# Patient Record
Sex: MALE | Race: BLACK OR AFRICAN AMERICAN | NOT HISPANIC OR LATINO | Employment: FULL TIME | URBAN - METROPOLITAN AREA
[De-identification: names, ages, dates, MRNs, and addresses within clinical notes are randomized per-mention and may not be internally consistent; named-entity substitution may affect disease eponyms.]

---

## 2023-06-30 LAB
ALANINE AMINOTRANSFERASE (SGPT) (U/L) IN SER/PLAS: 13 U/L (ref 10–52)
ALBUMIN (G/DL) IN SER/PLAS: 4.5 G/DL (ref 3.4–5)
ALKALINE PHOSPHATASE (U/L) IN SER/PLAS: 61 U/L (ref 33–120)
ANION GAP IN SER/PLAS: 16 MMOL/L (ref 10–20)
ASPARTATE AMINOTRANSFERASE (SGOT) (U/L) IN SER/PLAS: 13 U/L (ref 9–39)
BILIRUBIN TOTAL (MG/DL) IN SER/PLAS: 0.4 MG/DL (ref 0–1.2)
CALCIUM (MG/DL) IN SER/PLAS: 9.9 MG/DL (ref 8.6–10.6)
CARBON DIOXIDE, TOTAL (MMOL/L) IN SER/PLAS: 29 MMOL/L (ref 21–32)
CHLORIDE (MMOL/L) IN SER/PLAS: 103 MMOL/L (ref 98–107)
CHOLESTEROL (MG/DL) IN SER/PLAS: 229 MG/DL (ref 0–199)
CHOLESTEROL IN HDL (MG/DL) IN SER/PLAS: 41 MG/DL
CHOLESTEROL/HDL RATIO: 5.6
CREATININE (MG/DL) IN SER/PLAS: 0.93 MG/DL (ref 0.5–1.3)
ERYTHROCYTE DISTRIBUTION WIDTH (RATIO) BY AUTOMATED COUNT: 13.2 % (ref 11.5–14.5)
ERYTHROCYTE MEAN CORPUSCULAR HEMOGLOBIN CONCENTRATION (G/DL) BY AUTOMATED: 32.2 G/DL (ref 32–36)
ERYTHROCYTE MEAN CORPUSCULAR VOLUME (FL) BY AUTOMATED COUNT: 92 FL (ref 80–100)
ERYTHROCYTES (10*6/UL) IN BLOOD BY AUTOMATED COUNT: 5.28 X10E12/L (ref 4.5–5.9)
ESTIMATED AVERAGE GLUCOSE FOR HBA1C: 180 MG/DL
GFR MALE: >90 ML/MIN/1.73M2
GLUCOSE (MG/DL) IN SER/PLAS: 144 MG/DL (ref 74–99)
HEMATOCRIT (%) IN BLOOD BY AUTOMATED COUNT: 48.8 % (ref 41–52)
HEMOGLOBIN (G/DL) IN BLOOD: 15.7 G/DL (ref 13.5–17.5)
HEMOGLOBIN A1C/HEMOGLOBIN TOTAL IN BLOOD: 7.9 %
LDL: 154 MG/DL (ref 0–99)
LEUKOCYTES (10*3/UL) IN BLOOD BY AUTOMATED COUNT: 5.9 X10E9/L (ref 4.4–11.3)
NRBC (PER 100 WBCS) BY AUTOMATED COUNT: 0 /100 WBC (ref 0–0)
PLATELETS (10*3/UL) IN BLOOD AUTOMATED COUNT: 248 X10E9/L (ref 150–450)
POTASSIUM (MMOL/L) IN SER/PLAS: 4.5 MMOL/L (ref 3.5–5.3)
PROTEIN TOTAL: 7.4 G/DL (ref 6.4–8.2)
SODIUM (MMOL/L) IN SER/PLAS: 143 MMOL/L (ref 136–145)
TRIGLYCERIDE (MG/DL) IN SER/PLAS: 172 MG/DL (ref 0–149)
UREA NITROGEN (MG/DL) IN SER/PLAS: 15 MG/DL (ref 6–23)
VLDL: 34 MG/DL (ref 0–40)

## 2023-07-03 LAB
PROSTATE SPECIFIC AG (NG/ML) IN SER/PLAS: 1 NG/ML (ref 0–4)
PROSTATE SPECIFIC AG FREE (NG/ML) IN SER/PLAS: 0.3 NG/ML
PROSTATE SPECIFIC AG FREE/PROSTATE SPECIFIC AG TOTAL IN SER/PLAS: 30 %

## 2023-08-28 ENCOUNTER — HOSPITAL ENCOUNTER (OUTPATIENT)
Dept: DATA CONVERSION | Facility: HOSPITAL | Age: 58
Discharge: HOME | End: 2023-08-28

## 2023-08-28 DIAGNOSIS — Z51.89 ENCOUNTER FOR OTHER SPECIFIED AFTERCARE: ICD-10-CM

## 2023-11-20 ENCOUNTER — OFFICE VISIT (OUTPATIENT)
Dept: PAIN MEDICINE | Facility: HOSPITAL | Age: 58
End: 2023-11-20

## 2023-11-20 DIAGNOSIS — M51.37 DEGENERATION OF LUMBAR OR LUMBOSACRAL INTERVERTEBRAL DISC: Primary | ICD-10-CM

## 2023-11-20 PROCEDURE — 99214 OFFICE O/P EST MOD 30 MIN: CPT | Performed by: ANESTHESIOLOGY

## 2023-11-20 RX ORDER — METFORMIN HYDROCHLORIDE 1000 MG/1
TABLET ORAL
COMMUNITY
Start: 2013-11-13

## 2023-11-20 RX ORDER — ATORVASTATIN CALCIUM 20 MG/1
1 TABLET, FILM COATED ORAL DAILY
COMMUNITY
Start: 2019-10-09

## 2023-11-20 RX ORDER — EMPAGLIFLOZIN 10 MG/1
1 TABLET, FILM COATED ORAL DAILY
COMMUNITY
Start: 2019-06-10

## 2023-11-20 RX ORDER — AMITRIPTYLINE HYDROCHLORIDE 10 MG/1
10 TABLET, FILM COATED ORAL NIGHTLY
Qty: 30 TABLET | Refills: 11 | Status: SHIPPED | OUTPATIENT
Start: 2023-11-20 | End: 2024-11-19

## 2023-11-20 RX ORDER — TOPIRAMATE 100 MG/1
1 TABLET, FILM COATED ORAL 2 TIMES DAILY
COMMUNITY

## 2023-11-20 ASSESSMENT — ENCOUNTER SYMPTOMS
RESPIRATORY NEGATIVE: 1
WEAKNESS: 1
NECK PAIN: 1
EYES NEGATIVE: 1
CARDIOVASCULAR NEGATIVE: 1
DIZZINESS: 1
CONSTITUTIONAL NEGATIVE: 1
GASTROINTESTINAL NEGATIVE: 1
ARTHRALGIAS: 1
LIGHT-HEADEDNESS: 1

## 2023-11-20 NOTE — PROGRESS NOTES
"Subjective   Patient ID: Estephania Walton is a 58 y.o. male who presents for follow up regarding his CRPS in left arm/hand.    HPI  Patient has history of complex regional pain syndrome in his left arm/hand/wrist.   This pain and weakness started after a fall in August 2022.  He reports he occasionally has episodes where he feels dizzy and almost \"blacks out\" which is what caused his fall back in August.  He reports his last dizzy episode was 3 weeks ago.  Denies recent falls.    He reports overall improvement of his hand  strength and pain since his last visit with us in February 2023.  His pain is mostly in his pisiform and left wrist.  Also has pain in his hand and posterior left neck.  He feels acupuncture and physical therapy have significantly helped him.  He reports he had less than 1 day of relief from C6/7 epidural injection we did on 1/10/23.  He reports no relief from Topiramate and his only medication he is taking now for pain is Motrin.       In February 2023 we referred him to orthopedics and neurosurgery to address his left wrist and his cervical stenosis worst at C5/6.  Orthopedics found no structural issues with his wrist and gave him a wrist brace to help with CRPS.  Neurosurgery note reports his symptoms are more consistent with CRPS and not from his stenosis.      We referred him to neurology back in December 2022 to address his dizzy episodes but he is unsure if he has seen a neurologist yet- no neurology notes seen in EMR however he did have an EMG performed 12/14/22 which showed normal results.     Review of Systems   Constitutional: Negative.    HENT: Negative.     Eyes: Negative.    Respiratory: Negative.     Cardiovascular: Negative.    Gastrointestinal: Negative.    Musculoskeletal:  Positive for arthralgias and neck pain.   Neurological:  Positive for dizziness, weakness and light-headedness. Negative for syncope.       Objective   Physical Exam  Vitals reviewed.   Constitutional:       " Appearance: Normal appearance.   HENT:      Head: Normocephalic and atraumatic.      Mouth/Throat:      Mouth: Mucous membranes are moist.   Eyes:      Extraocular Movements: Extraocular movements intact.      Conjunctiva/sclera: Conjunctivae normal.   Cardiovascular:      Rate and Rhythm: Normal rate.   Pulmonary:      Effort: Pulmonary effort is normal.   Musculoskeletal:         General: Tenderness present.      Right wrist: Normal.      Left wrist: Tenderness and bony tenderness present. Decreased range of motion.      Right hand: Normal.      Left hand: Tenderness present. Decreased range of motion. Decreased strength of finger abduction, thumb/finger opposition and wrist extension.      Cervical back: Normal range of motion.   Skin:     General: Skin is warm and dry.   Neurological:      Mental Status: He is alert and oriented to person, place, and time.      Sensory: Sensation is intact.      Motor: Weakness present.      Comments: Avendano's negative   Psychiatric:         Mood and Affect: Mood normal.         Behavior: Behavior normal.         Assessment/Plan     CRPS Left upper extremity    - continue acupuncture and physical therapy  1x/week.  Continue home PT exercises regularly.  We discussed continuing physical therapy to help manage the patient's painful condition as the mainstay of treatment for CRPS. The patient was counseled that muscle strengthening will improve the long term prognosis in regards to pain and may also help increase range of motion and mobility. The patient's questions were answered and he was agreeable to this course.  - we will again refer patient to see neurology to address his dizzy/pre-syncopal episodes   - start amitriptyline 25 mg at bedtime.  Discussed with patient side effects of this medication and discussed he may break pill in half and take only 1/2 of pill if he feels this is more tolerable   - dicussed with patient option for stellate ganglion nerve block that could  potentially help his symptoms.  Patient stated he will defer injections for now and would rather continue PT and acupuncture for the time being.         Plan discussed with patient and attending Dr. Marianne Simpson, DO - PGY2

## 2023-12-21 ENCOUNTER — OFFICE VISIT (OUTPATIENT)
Dept: PRIMARY CARE | Facility: CLINIC | Age: 58
End: 2023-12-21
Payer: COMMERCIAL

## 2023-12-21 VITALS
OXYGEN SATURATION: 97 % | HEART RATE: 95 BPM | HEIGHT: 70 IN | TEMPERATURE: 97.4 F | DIASTOLIC BLOOD PRESSURE: 80 MMHG | WEIGHT: 236.11 LBS | BODY MASS INDEX: 33.8 KG/M2 | RESPIRATION RATE: 16 BRPM | SYSTOLIC BLOOD PRESSURE: 140 MMHG

## 2023-12-21 DIAGNOSIS — E78.01 FAMILIAL HYPERCHOLESTEREMIA: ICD-10-CM

## 2023-12-21 DIAGNOSIS — E11.9 TYPE 2 DIABETES MELLITUS WITHOUT COMPLICATION, WITHOUT LONG-TERM CURRENT USE OF INSULIN (MULTI): ICD-10-CM

## 2023-12-21 DIAGNOSIS — I10 PRIMARY HYPERTENSION: ICD-10-CM

## 2023-12-21 DIAGNOSIS — Z00.00 ENCOUNTER FOR ANNUAL PHYSICAL EXAM: Primary | ICD-10-CM

## 2023-12-21 DIAGNOSIS — Z12.11 SCREENING FOR COLON CANCER: ICD-10-CM

## 2023-12-21 PROBLEM — M25.562 ACUTE PAIN OF LEFT KNEE: Status: RESOLVED | Noted: 2023-12-21 | Resolved: 2023-12-21

## 2023-12-21 PROBLEM — M25.532 LEFT WRIST PAIN: Status: RESOLVED | Noted: 2023-12-21 | Resolved: 2023-12-21

## 2023-12-21 PROBLEM — H90.71 MIXED CONDUCTIVE AND SENSORINEURAL HEARING LOSS OF RIGHT EAR WITH UNRESTRICTED HEARING OF LEFT EAR: Status: ACTIVE | Noted: 2023-12-21

## 2023-12-21 PROBLEM — H69.90 EUSTACHIAN TUBE DISORDER: Status: RESOLVED | Noted: 2023-12-21 | Resolved: 2023-12-21

## 2023-12-21 PROBLEM — R42 DIZZINESS: Status: RESOLVED | Noted: 2023-12-21 | Resolved: 2023-12-21

## 2023-12-21 PROBLEM — M54.50 LOW BACK PAIN: Status: RESOLVED | Noted: 2023-12-21 | Resolved: 2023-12-21

## 2023-12-21 PROBLEM — S62.629A AVULSION FRACTURE OF MIDDLE PHALANX OF FINGER: Status: RESOLVED | Noted: 2023-12-21 | Resolved: 2023-12-21

## 2023-12-21 PROBLEM — R52 PAIN: Status: RESOLVED | Noted: 2023-12-21 | Resolved: 2023-12-21

## 2023-12-21 PROBLEM — M54.40 LOW BACK PAIN WITH SCIATICA: Status: ACTIVE | Noted: 2023-12-21

## 2023-12-21 PROBLEM — N40.0 BPH (BENIGN PROSTATIC HYPERPLASIA): Status: ACTIVE | Noted: 2023-12-21

## 2023-12-21 PROBLEM — R19.7 DIARRHEAL STOOLS: Status: RESOLVED | Noted: 2023-12-21 | Resolved: 2023-12-21

## 2023-12-21 PROBLEM — U07.1 COVID-19: Status: RESOLVED | Noted: 2023-12-21 | Resolved: 2023-12-21

## 2023-12-21 PROBLEM — R42 ACUTE SEVERE VERTIGO: Status: ACTIVE | Noted: 2023-12-21

## 2023-12-21 PROBLEM — R20.0 NUMBNESS IN LEFT LEG: Status: RESOLVED | Noted: 2023-12-21 | Resolved: 2023-12-21

## 2023-12-21 PROBLEM — M54.9 MID BACK PAIN: Status: RESOLVED | Noted: 2023-12-21 | Resolved: 2023-12-21

## 2023-12-21 PROBLEM — H91.92 HEARING LOSS IN LEFT EAR: Status: ACTIVE | Noted: 2023-12-21

## 2023-12-21 PROBLEM — M54.42 ACUTE BACK PAIN WITH SCIATICA, LEFT: Status: RESOLVED | Noted: 2023-12-21 | Resolved: 2023-12-21
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
## 2023-12-21 PROBLEM — M79.674 PAIN OF TOE OF RIGHT FOOT: Status: RESOLVED | Noted: 2023-12-21 | Resolved: 2023-12-21

## 2023-12-21 PROBLEM — M79.645 PAIN OF FINGER OF LEFT HAND: Status: ACTIVE | Noted: 2023-12-21

## 2023-12-21 PROBLEM — H90.3 SENSORINEURAL HEARING LOSS, BILATERAL: Status: ACTIVE | Noted: 2023-07-06

## 2023-12-21 PROCEDURE — 1036F TOBACCO NON-USER: CPT | Performed by: INTERNAL MEDICINE

## 2023-12-21 PROCEDURE — 4010F ACE/ARB THERAPY RXD/TAKEN: CPT | Performed by: INTERNAL MEDICINE

## 2023-12-21 PROCEDURE — 3079F DIAST BP 80-89 MM HG: CPT | Performed by: INTERNAL MEDICINE

## 2023-12-21 PROCEDURE — 3077F SYST BP >= 140 MM HG: CPT | Performed by: INTERNAL MEDICINE

## 2023-12-21 PROCEDURE — 99396 PREV VISIT EST AGE 40-64: CPT | Performed by: INTERNAL MEDICINE

## 2023-12-21 PROCEDURE — 3051F HG A1C>EQUAL 7.0%<8.0%: CPT | Performed by: INTERNAL MEDICINE

## 2023-12-21 RX ORDER — BLOOD SUGAR DIAGNOSTIC
STRIP MISCELLANEOUS
COMMUNITY
End: 2024-01-04

## 2023-12-21 RX ORDER — SITAGLIPTIN 100 MG/1
1 TABLET, FILM COATED ORAL DAILY
COMMUNITY
Start: 2019-09-24

## 2023-12-21 RX ORDER — ASPIRIN 81 MG/1
81 TABLET ORAL DAILY
COMMUNITY

## 2023-12-21 RX ORDER — LOSARTAN POTASSIUM 100 MG/1
1 TABLET ORAL DAILY
COMMUNITY
Start: 2021-12-02

## 2023-12-21 ASSESSMENT — ENCOUNTER SYMPTOMS
NUMBNESS: 0
DYSURIA: 0
POLYPHAGIA: 0
ADENOPATHY: 0
DIZZINESS: 0
BRUISES/BLEEDS EASILY: 0
PALPITATIONS: 0
BACK PAIN: 0
ARTHRALGIAS: 1
FLANK PAIN: 0
PHOTOPHOBIA: 0
CHILLS: 0
FATIGUE: 0
SPEECH DIFFICULTY: 0
WEAKNESS: 0
SEIZURES: 0
HEADACHES: 0
STRIDOR: 0
TREMORS: 0
APPETITE CHANGE: 0
HEMATURIA: 0
DIAPHORESIS: 0
COUGH: 0

## 2023-12-21 ASSESSMENT — PAIN SCALES - GENERAL: PAINLEVEL: 4

## 2023-12-21 NOTE — PROGRESS NOTES
"Subjective   Patient ID: Estephania Walton is a 58 y.o. male who presents for Annual Exam.    Annual physical exam.  He has hypertension hypercholesterolemia diabetes type 2, BMI 33.  History of benign positional vertigo.  Diagnosed with regional pain syndrome left hand after an injury to the left middle finger caused by a fall.  He has constant pain cervical spine radiating to the left trapezius muscle and shoulder.  He has left upper extremity weakness left hand weakness.  Follows up with pain management specialist.  Has had a functional capacity test, apply for disability.  Has continues physical therapy, recently acupuncture.  He also describes having syncopal episodes, he is aware of the environment but cannot talk.  Episodes are witnessed by his wife.         Review of Systems   Constitutional:  Negative for appetite change, chills, diaphoresis and fatigue.   HENT:  Negative for congestion, ear discharge, hearing loss, mouth sores and postnasal drip.    Eyes:  Negative for photophobia and visual disturbance.   Respiratory:  Negative for cough and stridor.    Cardiovascular:  Negative for palpitations and leg swelling.   Endocrine: Negative for cold intolerance, heat intolerance, polyphagia and polyuria.   Genitourinary:  Negative for decreased urine volume, dysuria, enuresis, flank pain and hematuria.   Musculoskeletal:  Positive for arthralgias. Negative for back pain and gait problem.        Lt. Hand pain, weakness.  Neck pain   Allergic/Immunologic: Negative for food allergies and immunocompromised state.   Neurological:  Positive for syncope. Negative for dizziness, tremors, seizures, speech difficulty, weakness, numbness and headaches.   Hematological:  Negative for adenopathy. Does not bruise/bleed easily.       Objective   BP (!) 168/92 (BP Location: Left arm, Patient Position: Sitting)   Pulse (!) 111   Temp 36.3 °C (97.4 °F) (Temporal)   Resp 16   Ht 1.79 m (5' 10.47\")   Wt 107 kg (236 lb 1.8 oz)   " SpO2 97%   BMI 33.43 kg/m²     Physical Exam  Constitutional:       Appearance: Normal appearance. He is obese.   HENT:      Head: Normocephalic and atraumatic.      Right Ear: External ear normal.      Left Ear: External ear normal.      Mouth/Throat:      Mouth: Mucous membranes are moist.      Pharynx: Oropharynx is clear.   Neck:      Vascular: No carotid bruit.   Cardiovascular:      Rate and Rhythm: Normal rate and regular rhythm.      Pulses:           Posterior tibial pulses are 3+ on the right side and 3+ on the left side.      Heart sounds: No murmur heard.     No gallop.   Pulmonary:      Effort: Pulmonary effort is normal. No respiratory distress.      Breath sounds: No wheezing or rales.   Abdominal:      General: Abdomen is flat.      Palpations: Abdomen is soft.      Hernia: No hernia is present.   Genitourinary:     Comments: Not examined.  Musculoskeletal:         General: No swelling, tenderness, deformity or signs of injury.      Cervical back: No rigidity or tenderness.      Right lower leg: No edema.      Comments: Tenderness to deep palpation left trapezius muscle.  Neck pain with lateral rotation of the head and dorsiflexion of the head.  Pain to touch left middle finger, medial aspect of the wrist.  Weakness left hand   Feet:      Right foot:      Protective Sensation: 10 sites tested.  10 sites sensed.      Skin integrity: Callus and dry skin present.      Left foot:      Protective Sensation: 10 sites tested.  10 sites sensed.      Skin integrity: Dry skin present.   Lymphadenopathy:      Cervical: No cervical adenopathy.   Skin:     Coloration: Skin is not jaundiced or pale.      Findings: No bruising, erythema, lesion or rash.   Neurological:      General: No focal deficit present.      Mental Status: He is oriented to person, place, and time.      Motor: No weakness.      Coordination: Coordination normal.   Psychiatric:         Mood and Affect: Mood normal.         Behavior: Behavior  normal.         Assessment/Plan   Problem List Items Addressed This Visit             ICD-10-CM    Familial hypercholesteremia E78.01     Last  despite prescription for atorvastatin 40 mg.  Advised to be compliant take medication as prescribed daily.  Follow low-cholesterol diet.  Check fasting lipid profile.         Relevant Orders    Lipid Panel    Hypertension I10    Relevant Orders    Comprehensive Metabolic Panel    Encounter for annual physical exam - Primary Z00.00     Declines vaccines.  Never completed the Cologuard test for colon cancer screening  as I recommended at previous visits.  Order placed again to have Cologuard test.  Have fasting blood work.  Follow healthy diet avoid processed meats fast food fried foods red meat.  Exercise regularly is much as possible.         Type 2 diabetes mellitus, without long-term current use of insulin (CMS/[facility]) E11.9     Last  hemoglobin A1c 7.9.  Follow low caloric diet avoid rice potatoes pasta.  Check hemoglobin A1c         Relevant Orders    Hemoglobin A1C     Other Visit Diagnoses         Codes    Screening for colon cancer     Z12.11    Relevant Orders    Cologuard® colon cancer screening

## 2023-12-22 NOTE — ASSESSMENT & PLAN NOTE
Last  hemoglobin A1c 7.9.  Follow low caloric diet avoid rice potatoes pasta.  Check hemoglobin A1c

## 2023-12-22 NOTE — ASSESSMENT & PLAN NOTE
Last  despite prescription for atorvastatin 40 mg.  Advised to be compliant take medication as prescribed daily.  Follow low-cholesterol diet.  Check fasting lipid profile.

## 2023-12-22 NOTE — ASSESSMENT & PLAN NOTE
Declines vaccines.  Never completed the Cologuard test for colon cancer screening  as I recommended at previous visits.  Order placed again to have Cologuard test.  Have fasting blood work.  Follow healthy diet avoid processed meats fast food fried foods red meat.  Exercise regularly is much as possible.

## 2023-12-22 NOTE — PATIENT INSTRUCTIONS
Have fasting blood work.    Take atorvastatin 40 mg daily.  Complete Cologuard test for colon cancer screening or receiving the kit.  Follow-up in 4 months.

## 2024-01-03 DIAGNOSIS — E11.9 TYPE 2 DIABETES MELLITUS WITHOUT COMPLICATION, WITHOUT LONG-TERM CURRENT USE OF INSULIN (MULTI): Primary | ICD-10-CM

## 2024-01-04 RX ORDER — BLOOD SUGAR DIAGNOSTIC
STRIP MISCELLANEOUS
Qty: 200 STRIP | Refills: 1 | Status: SHIPPED | OUTPATIENT
Start: 2024-01-04